# Patient Record
Sex: MALE | Race: WHITE | Employment: OTHER | URBAN - METROPOLITAN AREA
[De-identification: names, ages, dates, MRNs, and addresses within clinical notes are randomized per-mention and may not be internally consistent; named-entity substitution may affect disease eponyms.]

---

## 2021-04-21 ENCOUNTER — OFFICE VISIT (OUTPATIENT)
Dept: ENDOCRINOLOGY | Age: 46
End: 2021-04-21
Payer: COMMERCIAL

## 2021-04-21 VITALS
RESPIRATION RATE: 16 BRPM | TEMPERATURE: 96.6 F | DIASTOLIC BLOOD PRESSURE: 87 MMHG | OXYGEN SATURATION: 97 % | WEIGHT: 275.3 LBS | SYSTOLIC BLOOD PRESSURE: 112 MMHG | BODY MASS INDEX: 34.23 KG/M2 | HEART RATE: 83 BPM | HEIGHT: 75 IN

## 2021-04-21 DIAGNOSIS — R35.1 NOCTURIA: ICD-10-CM

## 2021-04-21 DIAGNOSIS — R63.5 WEIGHT GAIN: ICD-10-CM

## 2021-04-21 DIAGNOSIS — R79.89 LOW TESTOSTERONE: Primary | ICD-10-CM

## 2021-04-21 PROCEDURE — 99204 OFFICE O/P NEW MOD 45 MIN: CPT | Performed by: INTERNAL MEDICINE

## 2021-04-21 RX ORDER — TADALAFIL 10 MG/1
10 TABLET ORAL AS NEEDED
COMMUNITY
End: 2021-08-25

## 2021-04-21 NOTE — LETTER
4/21/2021 Patient: Dorota Wheeler YOB: 1975 Date of Visit: 4/21/2021 Alex Gandhi MD 
[address] [facility] [address] [address] [place] [id number] Via Fax: [phone] Dear Alex Gandhi MD, Thank you for referring Mr. Dorota Wheeler to [address] [place] [address] for evaluation. My notes for this consultation are attached. If you have questions, please do not hesitate to call me. I look forward to following your patient along with you. Sincerely, Yobany Herron MD

## 2021-04-21 NOTE — PROGRESS NOTES
Boby Gray MD          Patient Information Name : Lindie Litten 50 y.o.   YOB: 1975         Referred by: Cristin Johnson MD     Chief Complaint   Patient presents with    New Patient    Other     Testosterone        History of present illness    Lindie Litten is a 50 y.o. male here for evaluation of hypogonadism. [facility]. He presented with decrease in the libido, erectile dysfunction and testosterone was found to be 250 which was drawn at 11 AM.  Complains of snoring, few episodes of choking, no known diagnosis of sleep apnea. He has been gradually gaining weight, physically very active at work, no regular exercise. Not on any diet. No biological children    No trauma,surgery,radiation,chemotherapy  No viral illness (mumps) as a child  No delay in pubertal development  No anosmia  Prior history of alcohol  No new medications  No hair loss  No gynaecomastia  No prostate disorders    History reviewed. No pertinent past medical history. Current Outpatient Medications   Medication Sig    tadalafiL (Cialis) 10 mg tablet Take 10 mg by mouth as needed for Erectile Dysfunction.  OTHER ALEEVE     No current facility-administered medications for this visit. No Known Allergies      Review of Systems:  - Review of all systems negative other than mentioned in HPI    Physical Examination:  Resp. rate 16, height 6' 3\" (1.905 m), weight 275 lb 4.8 oz (124.9 kg).   - General: pleasant, no distress, good eye contact  - HEENT: no pallor, no periorbital edema, EOMI  - Neck: supple, no thyromegaly, no nodules  - Cardiovascular: regular,  normal S1 and S2,   - Respiratory: clear to auscultation bilaterally  - Gastrointestinal: soft, nontender,   - Musculoskeletal: no edema  - Neurological: alert and oriented  - Psychiatric: normal mood and affect    Data Reviewed:     [x] Reviewed labs    Assessment/Plan:       ICD-10-CM ICD-9-CM   1. Low testosterone  R79.89 790.99   2. Nocturia  R35.1 788.43   3. Weight gain  R63.5 783.1     Low testosterone    Patient presents with some features consistent with hypogonadism. 2 morning samples of low testosterone are necessary to confirm the diagnosis as testosterone peaks in the morning. Obesity,alcohol,opiods,obstructive sleep apnea,sellar tumors,medications can cause low testosterone. I discussed the need for additional tests to determine the etiology of hypogonadism. Further evaluation of secondary hypogonadism( if he has) with sellar imaging will be needed depending on the levels of the testosterone and labs. Discussed about the different treatment options . Educated on the risks and benefits of testosterone. The risks include erythrocytosis, worsening of BPH or an existing prostate cancer, worsening of untreated sleep apnea, acne, gynecomastia, decrease in testicular size and the risk of decreased spermatogenesis. There are no Patient Instructions on file for this visit. Copy sent to: Thank you for allowing me to participate in the care of this patient. Yoli Perez MD          Patient Juan M Marr verbalized understanding . Voice-recognition software was used to generate this report, which may result in some phonetic-based errors in the grammar and contents. Even though attempts were made to correct all the mistakes, some may have been missed and remained in the body of the report.

## 2021-04-29 LAB
FERRITIN SERPL-MCNC: 282 NG/ML (ref 26–388)
FSH SERPL-ACNC: 8.1 MIU/ML
LH SERPL-ACNC: 2.8 MIU/ML
PROLACTIN SERPL-MCNC: 7.4 NG/ML
PSA SERPL-MCNC: 0.5 NG/ML (ref 0.01–4)
TSH SERPL DL<=0.05 MIU/L-ACNC: 1.63 UIU/ML (ref 0.36–3.74)

## 2021-05-01 LAB — TESTOST FREE SERPL-MCNC: 4.6 PG/ML (ref 6.8–21.5)

## 2021-05-03 ENCOUNTER — TELEPHONE (OUTPATIENT)
Dept: ENDOCRINOLOGY | Age: 46
End: 2021-05-03

## 2021-05-03 NOTE — TELEPHONE ENCOUNTER
----- Message from Robin Rodriguez MD sent at 5/2/2021 10:27 PM EDT -----  Lab missed doing total testosterone, both total testosterone and free testosterone was ordered. See if we can add to the lab if not patient has to have the blood redrawn for total testosterone in the morning.

## 2021-05-03 NOTE — PROGRESS NOTES
Lab missed doing total testosterone, both total testosterone and free testosterone was ordered. See if we can add to the lab if not patient has to have the blood redrawn for total testosterone in the morning.

## 2021-05-26 ENCOUNTER — OFFICE VISIT (OUTPATIENT)
Dept: ENDOCRINOLOGY | Age: 46
End: 2021-05-26
Payer: COMMERCIAL

## 2021-05-26 VITALS
HEIGHT: 75 IN | SYSTOLIC BLOOD PRESSURE: 118 MMHG | OXYGEN SATURATION: 98 % | DIASTOLIC BLOOD PRESSURE: 85 MMHG | TEMPERATURE: 98.9 F | HEART RATE: 98 BPM | BODY MASS INDEX: 32.58 KG/M2 | WEIGHT: 262 LBS

## 2021-05-26 DIAGNOSIS — R79.89 LOW TESTOSTERONE: Primary | ICD-10-CM

## 2021-05-26 DIAGNOSIS — N52.01 ERECTILE DYSFUNCTION DUE TO ARTERIAL INSUFFICIENCY: ICD-10-CM

## 2021-05-26 DIAGNOSIS — E29.1 HYPOGONADISM IN MALE: ICD-10-CM

## 2021-05-26 PROCEDURE — 99214 OFFICE O/P EST MOD 30 MIN: CPT | Performed by: INTERNAL MEDICINE

## 2021-05-26 RX ORDER — CLOMIPHENE CITRATE 50 MG/1
50 TABLET ORAL DAILY
Qty: 30 TABLET | Refills: 3 | Status: SHIPPED | OUTPATIENT
Start: 2021-05-26 | End: 2021-08-25 | Stop reason: SDUPTHER

## 2021-05-26 NOTE — PROGRESS NOTES
Gabe Garcia MD          Patient Information Name : Paty Astorga 50 y.o.   YOB: 1975         Referred by: Celine Alejandra MD     Chief Complaint   Patient presents with    Other     Low T       History of present illness    Paty Astorga is a 50 y.o. male here for follow-up of hypogonadism. [facility]. He presented with decrease in the libido, erectile dysfunction and testosterone was found to be 250 which was drawn at 11 AM.  Complains of snoring, few episodes of choking  He has been gradually gaining weight, physically very active at work, no regular exercise. Not on any diet. No biological children    No trauma,surgery,radiation,chemotherapy  No viral illness (mumps) as a child  No delay in pubertal development  No anosmia  Prior history of alcohol  No new medications  No hair loss  No gynaecomastia  No prostate disorders    History reviewed. No pertinent past medical history. Current Outpatient Medications   Medication Sig    tadalafiL (Cialis) 10 mg tablet Take 10 mg by mouth as needed for Erectile Dysfunction.  OTHER ALEEVE    clomiPHENE (CLOMID) 50 mg tablet Take 1 Tablet by mouth daily. No current facility-administered medications for this visit. No Known Allergies      Review of Systems:  - Review of all systems negative other than mentioned in HPI    Physical Examination:  Blood pressure 118/85, pulse 98, temperature 98.9 °F (37.2 °C), height 6' 3\" (1.905 m), weight 262 lb (118.8 kg), SpO2 98 %.   - General: pleasant, no distress, good eye contact  - HEENT: no exophthalmos, no periorbital edema, EOMI  - Neck: No visible thyromegaly  - RS: Normal respiratory effort  - Musculoskeletal: no tremors  - Neurological: alert and oriented  - Psychiatric: normal mood and affect  - Skin: Normal color    Data Reviewed:     [x] Reviewed labs    Assessment/Plan: ICD-10-CM ICD-9-CM   1. Low testosterone  R79.89 790.99   2. Erectile dysfunction due to arterial insufficiency  N52.01 607.84   3. 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 The risks include erythrocytosis, worsening of BPH or an existing prostate cancer, worsening of untreated sleep apnea, acne, gynecomastia, decrease in testicular size and the risk of decreased spermatogenesis. There are no Patient Instructions on file for this visit. Follow-up and Dispositions    · Return in about 3 months (around 8/26/2021) for labs before next visit and follow up. Copy sent to: Thank you for allowing me to participate in the care of this patient. Yoli Perez MD          Patient Juan M Marr verbalized understanding . Voice-recognition software was used to generate this report, which may result in some phonetic-based errors in the grammar and contents. Even though attempts were made to correct all the mistakes, some may have been missed and remained in the body of the report.

## 2021-05-26 NOTE — LETTER
5/27/2021 Patient: Dorota Wheeler YOB: 1975 Date of Visit: 5/26/2021 Alex Gandhi MD 
[address] [id number] [place] [id number] Via Fax: [phone] Dear Alex Gandhi MD, Thank you for referring Mr. Dorota Wheeler to [id number] [place] [address] for evaluation. My notes for this consultation are attached. If you have questions, please do not hesitate to call me. I look forward to following your patient along with you. Sincerely, Yobany Herron MD

## 2021-06-04 LAB — TESTOST SERPL-MCNC: 351 NG/DL (ref 264–916)

## 2021-08-18 LAB — TESTOST SERPL-MCNC: 737 NG/DL (ref 264–916)

## 2021-08-25 ENCOUNTER — OFFICE VISIT (OUTPATIENT)
Dept: ENDOCRINOLOGY | Age: 46
End: 2021-08-25
Payer: COMMERCIAL

## 2021-08-25 VITALS
HEIGHT: 75 IN | DIASTOLIC BLOOD PRESSURE: 72 MMHG | WEIGHT: 275 LBS | SYSTOLIC BLOOD PRESSURE: 119 MMHG | TEMPERATURE: 98.8 F | OXYGEN SATURATION: 98 % | RESPIRATION RATE: 18 BRPM | HEART RATE: 72 BPM | BODY MASS INDEX: 34.19 KG/M2

## 2021-08-25 DIAGNOSIS — N52.01 ERECTILE DYSFUNCTION DUE TO ARTERIAL INSUFFICIENCY: ICD-10-CM

## 2021-08-25 DIAGNOSIS — E29.1 HYPOGONADISM IN MALE: Primary | ICD-10-CM

## 2021-08-25 DIAGNOSIS — R79.89 LOW TESTOSTERONE: ICD-10-CM

## 2021-08-25 PROCEDURE — 99214 OFFICE O/P EST MOD 30 MIN: CPT | Performed by: INTERNAL MEDICINE

## 2021-08-25 RX ORDER — CLOMIPHENE CITRATE 50 MG/1
50 TABLET ORAL DAILY
Qty: 30 TABLET | Refills: 3 | Status: SHIPPED | OUTPATIENT
Start: 2021-08-25 | End: 2022-01-28 | Stop reason: SDUPTHER

## 2021-08-25 NOTE — LETTER
8/27/2021    Patient: Analisa Araujo   YOB: 1975   Date of Visit: 8/25/2021     Mikal Adan MD  [address] [facility] [address]  Via Fax: [phone]    Dear Mikal Adan MD,      Thank you for referring Mr. Analisa Araujo to [address] [place] [address] for evaluation. My notes for this consultation are attached. If you have questions, please do not hesitate to call me. I look forward to following your patient along with you.       Sincerely,    Sathish Pereira MD

## 2021-08-25 NOTE — PROGRESS NOTES
Karen Quesada MD          Patient Information Name : Sofiya Beckett 48 y.o.   YOB: 1975         Referred by: Merary Evans MD     Chief Complaint   Patient presents with    Hypogonadism       History of present illness    Sofiya Beckett is a 48 y.o. male here for follow-up of hypogonadism. [facility]. He presented with decrease in the libido, erectile dysfunction and testosterone was found to be 250 which was drawn at 11 AM.  Complains of snoring, few episodes of choking  Reports that he has lost weight, our scale does not show  No biological children    No trauma,surgery,radiation,chemotherapy  No viral illness (mumps) as a child  No delay in pubertal development  No anosmia  Prior history of alcohol  No new medications  No hair loss  No gynaecomastia  No prostate disorders    No past medical history on file. Current Outpatient Medications   Medication Sig    OTHER ALEEVE    clomiPHENE (CLOMID) 50 mg tablet Take 1 Tablet by mouth daily. No current facility-administered medications for this visit. No Known Allergies      Review of Systems:  - Review of all systems negative other than mentioned in HPI    Physical Examination:  Blood pressure 119/72, pulse 72, temperature 98.8 °F (37.1 °C), temperature source Temporal, resp. rate 18, height 6' 3\" (1.905 m), weight 275 lb (124.7 kg), SpO2 98 %. - General: pleasant, no distress, good eye contact  - HEENT: no exophthalmos, no periorbital edema, EOMI  - Neck: No visible thyromegaly  - RS: Normal respiratory effort  - Musculoskeletal: no tremors  - Neurological: alert and oriented  - Psychiatric: normal mood and affect  - Skin: Normal color    Data Reviewed:     [x] Reviewed labs    Assessment/Plan:       ICD-10-CM ICD-9-CM   1. Hypogonadism in male  E29.1 257.2   2.  Erectile dysfunction due to arterial insufficiency N52.01 607.84     Hypogonadism  Erectile dysfunction  Possible sleep apnea    Hypogonadotropic hypogonadism  Initial testosterone 250    Prolactin, ferritin normal  Discussed about getting tested for sleep apnea, follow-up with PCP  Doing well on off label use of clomiphene citrate    Educated on the risks and benefits of testosterone. The risks include erythrocytosis, worsening of BPH or an existing prostate cancer, worsening of untreated sleep apnea, acne, gynecomastia, decrease in testicular size and the risk of decreased spermatogenesis. ED: Cialis 20 mg did not help  He is on Viagra    Nonmorbid obesity: Goal is to lose 30 pounds so he can come off  testosterone, which also will help sleep apnea if he has. Aware of symptoms of sleep apnea  There are no Patient Instructions on file for this visit. Follow-up and Dispositions    · Return in about 5 months (around 1/25/2022) for labs before next visit and follow up. Copy sent to: Thank you for allowing me to participate in the care of this patient. Drake Marquez MD          Patient Georgie Cooney verbalized understanding . Voice-recognition software was used to generate this report, which may result in some phonetic-based errors in the grammar and contents. Even though attempts were made to correct all the mistakes, some may have been missed and remained in the body of the report.

## 2021-08-25 NOTE — PROGRESS NOTES
Garfield Ayala is a 48 y.o. male here for   Chief Complaint   Patient presents with    Hypogonadism       1. Have you been to the ER, urgent care clinic since your last visit? Hospitalized since your last visit? -no    2. Have you seen or consulted any other health care providers outside of the [address] [place] [address] since your last visit?   Include any pap smears or colon screening.-no

## 2022-01-25 LAB
HCT VFR BLD AUTO: 45.5 % (ref 37.5–51)
HGB BLD-MCNC: 15.6 G/DL (ref 13–17.7)
PSA SERPL-MCNC: 0.4 NG/ML (ref 0–4)
TESTOST SERPL-MCNC: 459 NG/DL (ref 264–916)

## 2022-01-28 ENCOUNTER — OFFICE VISIT (OUTPATIENT)
Dept: ENDOCRINOLOGY | Age: 47
End: 2022-01-28
Payer: COMMERCIAL

## 2022-01-28 VITALS
TEMPERATURE: 98.3 F | SYSTOLIC BLOOD PRESSURE: 128 MMHG | OXYGEN SATURATION: 96 % | DIASTOLIC BLOOD PRESSURE: 82 MMHG | HEART RATE: 69 BPM | BODY MASS INDEX: 34.69 KG/M2 | RESPIRATION RATE: 18 BRPM | HEIGHT: 75 IN | WEIGHT: 279 LBS

## 2022-01-28 DIAGNOSIS — N52.01 ERECTILE DYSFUNCTION DUE TO ARTERIAL INSUFFICIENCY: Primary | ICD-10-CM

## 2022-01-28 DIAGNOSIS — R79.89 LOW TESTOSTERONE: ICD-10-CM

## 2022-01-28 DIAGNOSIS — E66.9 NON MORBID OBESITY: ICD-10-CM

## 2022-01-28 PROCEDURE — 99214 OFFICE O/P EST MOD 30 MIN: CPT | Performed by: INTERNAL MEDICINE

## 2022-01-28 RX ORDER — CLOMIPHENE CITRATE 50 MG/1
50 TABLET ORAL DAILY
Qty: 30 TABLET | Refills: 1 | Status: SHIPPED | OUTPATIENT
Start: 2022-01-28

## 2022-01-28 NOTE — PROGRESS NOTES
Kassidy Soares is a 48 y.o. male here for   Chief Complaint   Patient presents with    Hypogonadism       1. Have you been to the ER, urgent care clinic since your last visit? Hospitalized since your last visit? -no    2. Have you seen or consulted any other health care providers outside of the [address] [place] [address] since your last visit?   Include any pap smears or colon screening.-[facility]

## 2022-01-28 NOTE — PROGRESS NOTES
Barrett Le MD          Patient Information Name : Robinson Franz 48 y.o.   YOB: 1975         Referred by: Elvia Puente MD     Chief Complaint   Patient presents with    Hypogonadism       History of present illness    Robinson Franz is a 48 y.o. male here for follow-up of hypogonadism. [facility]. He presented with decrease in the libido, erectile dysfunction and testosterone was found to be 250 which was drawn at 11 AM.  Complains of snoring, few episodes of choking, did not have sleep study yet  No weight loss  Off Clomid for 1-1/2-month    No biological children    No trauma,surgery,radiation,chemotherapy  No viral illness (mumps) as a child  No delay in pubertal development  No anosmia  Prior history of alcohol  No new medications  No hair loss  No gynaecomastia  No prostate disorders    History reviewed. No pertinent past medical history. Current Outpatient Medications   Medication Sig    clomiPHENE (CLOMID) 50 mg tablet Take 1 Tablet by mouth daily.  OTHER ALEEVE     No current facility-administered medications for this visit. No Known Allergies      Review of Systems:  - Review of all systems negative other than mentioned in HPI    Physical Examination:  Blood pressure 128/82, pulse 69, temperature 98.3 °F (36.8 °C), temperature source Temporal, resp. rate 18, height 6' 3\" (1.905 m), weight 279 lb (126.6 kg), SpO2 96 %. - General: pleasant, no distress, good eye contact  - HEENT: no exophthalmos, no periorbital edema, EOMI  - Neck: No visible thyromegaly  - RS: Normal respiratory effort  - Musculoskeletal: no tremors  - Neurological: alert and oriented  - Psychiatric: normal mood and affect  - Skin: Normal color    Data Reviewed:     [x] Reviewed labs    Assessment/Plan:       ICD-10-CM ICD-9-CM   1.  Erectile dysfunction due to arterial insufficiency N52.01 607.84   2. Low testosterone  R79.89 790.99     Hypogonadism  Erectile dysfunction  Possible sleep apnea    Hypogonadotropic hypogonadism  Initial testosterone level was 250, after Clomid 1-1/2-month, testosterone level more than 400  Continue staying off  Weight loss encouraged    Prolactin, ferritin normal  Discussed about getting tested for sleep apnea, follow-up with PCP  Doing well on off label use of clomiphene citrate    Educated on the risks and benefits of testosterone. The risks include erythrocytosis, worsening of BPH or an existing prostate cancer, worsening of untreated sleep apnea, acne, gynecomastia, decrease in testicular size and the risk of decreased spermatogenesis. ED: Cialis 20 mg did not help  He is on Viagra    Nonmorbid obesity: Goal is to lose 30 pounds so he can come off  testosterone, which also will help sleep apnea if he has. Aware of symptoms of sleep apnea  There are no Patient Instructions on file for this visit. Follow-up and Dispositions    · Return in about 6 months (around 7/28/2022) for labs before next visit and follow up. Copy sent to: Thank you for allowing me to participate in the care of this patient. Olen Scheuermann, MD          Patient Jovan Found verbalized understanding . Voice-recognition software was used to generate this report, which may result in some phonetic-based errors in the grammar and contents. Even though attempts were made to correct all the mistakes, some may have been missed and remained in the body of the report.

## 2022-01-28 NOTE — LETTER
1/28/2022    Patient: Ruth Garcia   YOB: 1975   Date of Visit: 1/28/2022     Penelope Dowd MD  [address] [facility] [address]  Via Fax: [phone]    Dear Penelope Dowd MD,      Thank you for referring Mr. Ruth Garcia to [address] [place] [address] for evaluation. My notes for this consultation are attached. If you have questions, please do not hesitate to call me. I look forward to following your patient along with you.       Sincerely,    Doris Castillo MD

## 2022-07-25 LAB
ALBUMIN SERPL-MCNC: 4.8 G/DL (ref 4–5)
ALP SERPL-CCNC: 81 IU/L (ref 44–121)
ALT SERPL-CCNC: 92 IU/L (ref 0–44)
AST SERPL-CCNC: 50 IU/L (ref 0–40)
BILIRUB DIRECT SERPL-MCNC: <0.1 MG/DL (ref 0–0.4)
BILIRUB SERPL-MCNC: 0.4 MG/DL (ref 0–1.2)
HCT VFR BLD AUTO: 42.7 % (ref 37.5–51)
HGB BLD-MCNC: 14.5 G/DL (ref 13–17.7)
PROT SERPL-MCNC: 7.6 G/DL (ref 6–8.5)
TESTOST SERPL-MCNC: 328 NG/DL (ref 264–916)

## 2022-07-29 ENCOUNTER — OFFICE VISIT (OUTPATIENT)
Dept: ENDOCRINOLOGY | Age: 47
End: 2022-07-29
Payer: COMMERCIAL

## 2022-07-29 VITALS
DIASTOLIC BLOOD PRESSURE: 77 MMHG | OXYGEN SATURATION: 97 % | HEART RATE: 73 BPM | BODY MASS INDEX: 34.07 KG/M2 | HEIGHT: 75 IN | WEIGHT: 274 LBS | SYSTOLIC BLOOD PRESSURE: 122 MMHG | TEMPERATURE: 98 F

## 2022-07-29 DIAGNOSIS — R35.1 NOCTURIA: ICD-10-CM

## 2022-07-29 DIAGNOSIS — N52.01 ERECTILE DYSFUNCTION DUE TO ARTERIAL INSUFFICIENCY: ICD-10-CM

## 2022-07-29 DIAGNOSIS — R74.01 TRANSAMINITIS: ICD-10-CM

## 2022-07-29 DIAGNOSIS — E29.1 HYPOGONADISM IN MALE: Primary | ICD-10-CM

## 2022-07-29 PROCEDURE — 99214 OFFICE O/P EST MOD 30 MIN: CPT | Performed by: INTERNAL MEDICINE

## 2022-07-29 NOTE — PROGRESS NOTES
Jorge Alberto Villalta MD          Patient Information Name : Patito Mello 50 y.o.   YOB: 1975         Referred by: Yonas Booth MD     Chief Complaint   Patient presents with    Other     Low T         History of present illness    Patito Mello is a 50 y.o. male here for follow-up of hypogonadism. [facility]. He presented with decrease in the libido, erectile dysfunction and testosterone was found to be 250 which was drawn at 11 AM.  Complains of snoring, few episodes of choking, did not have sleep study yet    Off Clomid: Testosterone 450, then 350  Continue to work on lifestyle  No chest pain  Liver enzymes elevated, no alcohol, asymptomatic    No biological children      History reviewed. No pertinent past medical history. Current Outpatient Medications   Medication Sig    clomiPHENE (CLOMID) 50 mg tablet Take 1 Tablet by mouth daily. OTHER ALEEVE     No current facility-administered medications for this visit. No Known Allergies      Review of Systems:  Per HPI    Physical Examination:  Blood pressure 122/77, pulse 73, temperature 98 °F (36.7 °C), temperature source Temporal, height 6' 3\" (1.905 m), weight 274 lb (124.3 kg), SpO2 97 %. General: pleasant, no distress, good eye contact  HEENT: no exophthalmos, no periorbital edema, EOMI  Neck: No visible thyromegaly  RS: Normal respiratory effort  Abdomen: No hepatomegaly, nontender  Musculoskeletal: no tremors  Neurological: alert and oriented  Psychiatric: normal mood and affect  Skin: Normal color    Data Reviewed:     [x] Reviewed labs    Assessment/Plan:       ICD-10-CM ICD-9-CM   1. Hypogonadism in male  E29.1 257.2   2. Nocturia  R35.1 788.43   3. Non morbid obesity  E66.9 278.00   4. Erectile dysfunction due to arterial insufficiency  N52.01 607.84   5.  Transaminitis  R74.01 790.4       Hypogonadism  Erectile dysfunction  Possible sleep apnea: We will get sleep study    Hypogonadotropic hypogonadism  Initial testosterone level was 250, after Clomid 1-1/2-month, testosterone level more than 400  Testosterone 320 July 2022  Off Clomid  Weight loss encouraged    Prolactin, ferritin normal    Was Doing well on off label use of clomiphene citrate    Transaminitis: Asymptomatic, no alcohol, no change in the medications  Right upper quadrant ultrasound  Repeat LFTs  Follow-up with PCP      ED: Cialis 20 mg did not help  He is on Viagra    Nonmorbid obesity: Goal is to lose 30 pounds so he can come off  testosterone, which also will help sleep apnea if he has. Aware of symptoms of sleep apnea  There are no Patient Instructions on file for this visit. Copy sent to: Thank you for allowing me to participate in the care of this patient. Donna Pressley MD          Patient Guerita Huffman verbalized understanding . Voice-recognition software was used to generate this report, which may result in some phonetic-based errors in the grammar and contents. Even though attempts were made to correct all the mistakes, some may have been missed and remained in the body of the report.

## 2022-07-29 NOTE — LETTER
7/29/2022    Patient: Tim Lassiter   YOB: 1975   Date of Visit: 7/29/2022     James Albrecht MD  [address] [facility] [address]  Via Fax: [phone]    Dear James Albrecht MD,      Thank you for referring Mr. Tim Lassiter to [address] [place] [address] for evaluation. My notes for this consultation are attached. If you have questions, please do not hesitate to call me. I look forward to following your patient along with you.       Sincerely,    Scar Houser MD

## 2022-08-08 ENCOUNTER — HOSPITAL ENCOUNTER (OUTPATIENT)
Dept: ULTRASOUND IMAGING | Age: 47
Discharge: HOME | End: 2022-08-08
Attending: INTERNAL MEDICINE
Payer: COMMERCIAL

## 2022-08-08 DIAGNOSIS — R74.01 TRANSAMINITIS: ICD-10-CM

## 2022-08-08 DIAGNOSIS — N52.01 ERECTILE DYSFUNCTION DUE TO ARTERIAL INSUFFICIENCY: ICD-10-CM

## 2022-08-08 DIAGNOSIS — R35.1 NOCTURIA: ICD-10-CM

## 2022-08-08 DIAGNOSIS — E29.1 HYPOGONADISM IN MALE: ICD-10-CM

## 2022-08-08 PROCEDURE — 76705 ECHO EXAM OF ABDOMEN: CPT

## 2022-11-17 ENCOUNTER — HOSPITAL ENCOUNTER (OUTPATIENT)
Dept: PREADMISSION TESTING | Age: 47
Discharge: HOME | End: 2022-11-17
Payer: COMMERCIAL

## 2022-11-17 VITALS
HEART RATE: 72 BPM | SYSTOLIC BLOOD PRESSURE: 137 MMHG | RESPIRATION RATE: 16 BRPM | TEMPERATURE: 46 F | OXYGEN SATURATION: 98 % | WEIGHT: 286.6 LBS | BODY MASS INDEX: 34.9 KG/M2 | DIASTOLIC BLOOD PRESSURE: 98 MMHG | HEIGHT: 76 IN

## 2022-11-17 LAB
ANION GAP SERPL CALC-SCNC: 4 MMOL/L (ref 5–15)
BUN SERPL-MCNC: 14 MG/DL (ref 6–20)
BUN/CREAT SERPL: 14 (ref 12–20)
CA-I BLD-MCNC: 9.4 MG/DL (ref 8.5–10.1)
CHLORIDE SERPL-SCNC: 110 MMOL/L (ref 97–108)
CO2 SERPL-SCNC: 26 MMOL/L (ref 21–32)
CREAT SERPL-MCNC: 0.99 MG/DL (ref 0.7–1.3)
ERYTHROCYTE [DISTWIDTH] IN BLOOD BY AUTOMATED COUNT: 11.9 % (ref 11.5–14.5)
GLUCOSE SERPL-MCNC: 134 MG/DL (ref 65–100)
HCT VFR BLD AUTO: 44.6 % (ref 36.6–50.3)
HGB BLD-MCNC: 15.2 G/DL (ref 12.1–17)
MCH RBC QN AUTO: 31 PG (ref 26–34)
MCHC RBC AUTO-ENTMCNC: 34.1 G/DL (ref 30–36.5)
MCV RBC AUTO: 91 FL (ref 80–99)
NRBC # BLD: 0 K/UL (ref 0–0.01)
NRBC BLD-RTO: 0 PER 100 WBC
PLATELET # BLD AUTO: 266 K/UL (ref 150–400)
PMV BLD AUTO: 9.5 FL (ref 8.9–12.9)
POTASSIUM SERPL-SCNC: 4.2 MMOL/L (ref 3.5–5.1)
RBC # BLD AUTO: 4.9 M/UL (ref 4.1–5.7)
SODIUM SERPL-SCNC: 140 MMOL/L (ref 136–145)
WBC # BLD AUTO: 7.8 K/UL (ref 4.1–11.1)

## 2022-11-17 PROCEDURE — 85027 COMPLETE CBC AUTOMATED: CPT

## 2022-11-17 PROCEDURE — 93005 ELECTROCARDIOGRAM TRACING: CPT

## 2022-11-17 PROCEDURE — 80048 BASIC METABOLIC PNL TOTAL CA: CPT

## 2022-11-17 PROCEDURE — 36415 COLL VENOUS BLD VENIPUNCTURE: CPT

## 2022-11-18 LAB
ATRIAL RATE: 70 BPM
CALCULATED P AXIS, ECG09: 32 DEGREES
CALCULATED R AXIS, ECG10: -34 DEGREES
CALCULATED T AXIS, ECG11: 30 DEGREES
DIAGNOSIS, 93000: NORMAL
P-R INTERVAL, ECG05: 210 MS
Q-T INTERVAL, ECG07: 404 MS
QRS DURATION, ECG06: 94 MS
QTC CALCULATION (BEZET), ECG08: 436 MS
VENTRICULAR RATE, ECG03: 70 BPM

## 2022-11-21 ENCOUNTER — ANESTHESIA (OUTPATIENT)
Dept: SURGERY | Age: 47
End: 2022-11-21
Payer: COMMERCIAL

## 2022-11-21 ENCOUNTER — ANESTHESIA EVENT (OUTPATIENT)
Dept: SURGERY | Age: 47
End: 2022-11-21
Payer: COMMERCIAL

## 2022-11-21 ENCOUNTER — HOSPITAL ENCOUNTER (OUTPATIENT)
Age: 47
Discharge: HOME | End: 2022-11-21
Attending: ORTHOPAEDIC SURGERY | Admitting: ORTHOPAEDIC SURGERY
Payer: COMMERCIAL

## 2022-11-21 VITALS
TEMPERATURE: 97.9 F | OXYGEN SATURATION: 97 % | RESPIRATION RATE: 20 BRPM | SYSTOLIC BLOOD PRESSURE: 131 MMHG | DIASTOLIC BLOOD PRESSURE: 81 MMHG | HEART RATE: 72 BPM

## 2022-11-21 DIAGNOSIS — S43.432A LABRAL TEAR OF SHOULDER, LEFT, INITIAL ENCOUNTER: Primary | ICD-10-CM

## 2022-11-21 PROCEDURE — 76210000021 HC REC RM PH II 0.5 TO 1 HR: Performed by: ORTHOPAEDIC SURGERY

## 2022-11-21 PROCEDURE — A4565 SLINGS: HCPCS | Performed by: ORTHOPAEDIC SURGERY

## 2022-11-21 PROCEDURE — 76060000035 HC ANESTHESIA 2 TO 2.5 HR: Performed by: ORTHOPAEDIC SURGERY

## 2022-11-21 PROCEDURE — 77030034478 HC TU IRR ARTHRO PT ARTH -B: Performed by: ORTHOPAEDIC SURGERY

## 2022-11-21 PROCEDURE — 74011250636 HC RX REV CODE- 250/636: Performed by: REGISTERED NURSE

## 2022-11-21 PROCEDURE — 77030018673: Performed by: ORTHOPAEDIC SURGERY

## 2022-11-21 PROCEDURE — 77030002916 HC SUT ETHLN J&J -A: Performed by: ORTHOPAEDIC SURGERY

## 2022-11-21 PROCEDURE — 2709999900 HC NON-CHARGEABLE SUPPLY: Performed by: ORTHOPAEDIC SURGERY

## 2022-11-21 PROCEDURE — 77030010816: Performed by: ORTHOPAEDIC SURGERY

## 2022-11-21 PROCEDURE — 77030040361 HC SLV COMPR DVT MDII -B: Performed by: ORTHOPAEDIC SURGERY

## 2022-11-21 PROCEDURE — 74011250636 HC RX REV CODE- 250/636: Performed by: ANESTHESIOLOGY

## 2022-11-21 PROCEDURE — 76010000131 HC OR TIME 2 TO 2.5 HR: Performed by: ORTHOPAEDIC SURGERY

## 2022-11-21 PROCEDURE — 77030002966 HC SUT PDS J&J -A: Performed by: ORTHOPAEDIC SURGERY

## 2022-11-21 PROCEDURE — 77030008009 HC PRB ARTHO ABLT ARTH -C: Performed by: ORTHOPAEDIC SURGERY

## 2022-11-21 PROCEDURE — 77030022877 HC TU IRR ARTHRO PMP ARTH -B: Performed by: ORTHOPAEDIC SURGERY

## 2022-11-21 PROCEDURE — 77030022878 HC BUR ARTH SHVR ARTH -B: Performed by: ORTHOPAEDIC SURGERY

## 2022-11-21 PROCEDURE — 77030002922 HC SUT FBRWRE ARTH -B: Performed by: ORTHOPAEDIC SURGERY

## 2022-11-21 PROCEDURE — 76210000006 HC OR PH I REC 0.5 TO 1 HR: Performed by: ORTHOPAEDIC SURGERY

## 2022-11-21 PROCEDURE — 77030008493 HC TBNG ARTHOSC EXT ARTH -B: Performed by: ORTHOPAEDIC SURGERY

## 2022-11-21 PROCEDURE — 77030031140 HC SUT VCRL3 J&J -A: Performed by: ORTHOPAEDIC SURGERY

## 2022-11-21 PROCEDURE — 77030003665 HC NDL SPN BBMI -A: Performed by: ORTHOPAEDIC SURGERY

## 2022-11-21 PROCEDURE — 74011250636 HC RX REV CODE- 250/636: Performed by: ORTHOPAEDIC SURGERY

## 2022-11-21 PROCEDURE — 77030002933 HC SUT MCRYL J&J -A: Performed by: ORTHOPAEDIC SURGERY

## 2022-11-21 PROCEDURE — 77030003598 HC NDL MULT/FIRE ARTH -C: Performed by: ORTHOPAEDIC SURGERY

## 2022-11-21 PROCEDURE — 77030038066 HC BLD SHVR ARTH -B: Performed by: ORTHOPAEDIC SURGERY

## 2022-11-21 PROCEDURE — 74011000250 HC RX REV CODE- 250: Performed by: ANESTHESIOLOGY

## 2022-11-21 PROCEDURE — 74011000250 HC RX REV CODE- 250: Performed by: REGISTERED NURSE

## 2022-11-21 PROCEDURE — 77030042022 HC SYST COLD THRPY BREG -B: Performed by: ORTHOPAEDIC SURGERY

## 2022-11-21 RX ORDER — ROCURONIUM BROMIDE 10 MG/ML
INJECTION, SOLUTION INTRAVENOUS AS NEEDED
Status: DISCONTINUED | OUTPATIENT
Start: 2022-11-21 | End: 2022-11-21 | Stop reason: HOSPADM

## 2022-11-21 RX ORDER — BUPIVACAINE HYDROCHLORIDE 5 MG/ML
INJECTION, SOLUTION EPIDURAL; INTRACAUDAL
Status: COMPLETED | OUTPATIENT
Start: 2022-11-21 | End: 2022-11-21

## 2022-11-21 RX ORDER — DIPHENHYDRAMINE HYDROCHLORIDE 50 MG/ML
12.5 INJECTION, SOLUTION INTRAMUSCULAR; INTRAVENOUS AS NEEDED
Status: DISCONTINUED | OUTPATIENT
Start: 2022-11-21 | End: 2022-11-21 | Stop reason: HOSPADM

## 2022-11-21 RX ORDER — HYDROMORPHONE HYDROCHLORIDE 1 MG/ML
0.5 INJECTION, SOLUTION INTRAMUSCULAR; INTRAVENOUS; SUBCUTANEOUS
Status: DISCONTINUED | OUTPATIENT
Start: 2022-11-21 | End: 2022-11-21 | Stop reason: HOSPADM

## 2022-11-21 RX ORDER — CHOLECALCIFEROL (VITAMIN D3) 125 MCG
CAPSULE ORAL
COMMUNITY

## 2022-11-21 RX ORDER — SODIUM CHLORIDE 0.9 % (FLUSH) 0.9 %
5-40 SYRINGE (ML) INJECTION EVERY 8 HOURS
Status: DISCONTINUED | OUTPATIENT
Start: 2022-11-21 | End: 2022-11-21 | Stop reason: HOSPADM

## 2022-11-21 RX ORDER — ONDANSETRON 2 MG/ML
INJECTION INTRAMUSCULAR; INTRAVENOUS AS NEEDED
Status: DISCONTINUED | OUTPATIENT
Start: 2022-11-21 | End: 2022-11-21 | Stop reason: HOSPADM

## 2022-11-21 RX ORDER — OXYCODONE AND ACETAMINOPHEN 5; 325 MG/1; MG/1
1 TABLET ORAL AS NEEDED
Status: DISCONTINUED | OUTPATIENT
Start: 2022-11-21 | End: 2022-11-21 | Stop reason: HOSPADM

## 2022-11-21 RX ORDER — PROPOFOL 10 MG/ML
INJECTION, EMULSION INTRAVENOUS AS NEEDED
Status: DISCONTINUED | OUTPATIENT
Start: 2022-11-21 | End: 2022-11-21 | Stop reason: HOSPADM

## 2022-11-21 RX ORDER — MIDAZOLAM HYDROCHLORIDE 1 MG/ML
1 INJECTION, SOLUTION INTRAMUSCULAR; INTRAVENOUS AS NEEDED
Status: DISCONTINUED | OUTPATIENT
Start: 2022-11-21 | End: 2022-11-21 | Stop reason: HOSPADM

## 2022-11-21 RX ORDER — SODIUM CHLORIDE, SODIUM LACTATE, POTASSIUM CHLORIDE, CALCIUM CHLORIDE 600; 310; 30; 20 MG/100ML; MG/100ML; MG/100ML; MG/100ML
INJECTION, SOLUTION INTRAVENOUS
Status: DISCONTINUED | OUTPATIENT
Start: 2022-11-21 | End: 2022-11-21 | Stop reason: HOSPADM

## 2022-11-21 RX ORDER — CYCLOBENZAPRINE HCL 10 MG
5 TABLET ORAL
Qty: 21 TABLET | Refills: 0 | Status: SHIPPED | OUTPATIENT
Start: 2022-11-21

## 2022-11-21 RX ORDER — LIDOCAINE HYDROCHLORIDE 20 MG/ML
INJECTION, SOLUTION EPIDURAL; INFILTRATION; INTRACAUDAL; PERINEURAL AS NEEDED
Status: DISCONTINUED | OUTPATIENT
Start: 2022-11-21 | End: 2022-11-21 | Stop reason: HOSPADM

## 2022-11-21 RX ORDER — DEXAMETHASONE SODIUM PHOSPHATE 4 MG/ML
INJECTION, SOLUTION INTRA-ARTICULAR; INTRALESIONAL; INTRAMUSCULAR; INTRAVENOUS; SOFT TISSUE AS NEEDED
Status: DISCONTINUED | OUTPATIENT
Start: 2022-11-21 | End: 2022-11-21 | Stop reason: HOSPADM

## 2022-11-21 RX ORDER — MORPHINE SULFATE 2 MG/ML
2 INJECTION, SOLUTION INTRAMUSCULAR; INTRAVENOUS
Status: DISCONTINUED | OUTPATIENT
Start: 2022-11-21 | End: 2022-11-21 | Stop reason: HOSPADM

## 2022-11-21 RX ORDER — SODIUM CHLORIDE 0.9 % (FLUSH) 0.9 %
5-40 SYRINGE (ML) INJECTION AS NEEDED
Status: DISCONTINUED | OUTPATIENT
Start: 2022-11-21 | End: 2022-11-21 | Stop reason: HOSPADM

## 2022-11-21 RX ORDER — TRAMADOL HYDROCHLORIDE 50 MG/1
50 TABLET ORAL
Qty: 30 TABLET | Refills: 0 | Status: SHIPPED | OUTPATIENT
Start: 2022-11-21 | End: 2022-12-05

## 2022-11-21 RX ORDER — OXYCODONE HYDROCHLORIDE 5 MG/1
5 TABLET ORAL
Qty: 30 TABLET | Refills: 0 | Status: SHIPPED | OUTPATIENT
Start: 2022-11-21 | End: 2022-12-05

## 2022-11-21 RX ORDER — SODIUM CHLORIDE, SODIUM LACTATE, POTASSIUM CHLORIDE, CALCIUM CHLORIDE 600; 310; 30; 20 MG/100ML; MG/100ML; MG/100ML; MG/100ML
20 INJECTION, SOLUTION INTRAVENOUS CONTINUOUS
Status: DISCONTINUED | OUTPATIENT
Start: 2022-11-21 | End: 2022-11-21 | Stop reason: HOSPADM

## 2022-11-21 RX ORDER — DEXAMETHASONE SODIUM PHOSPHATE 4 MG/ML
INJECTION, SOLUTION INTRA-ARTICULAR; INTRALESIONAL; INTRAMUSCULAR; INTRAVENOUS; SOFT TISSUE
Status: COMPLETED | OUTPATIENT
Start: 2022-11-21 | End: 2022-11-21

## 2022-11-21 RX ORDER — NORETHINDRONE AND ETHINYL ESTRADIOL 0.5-0.035
5 KIT ORAL AS NEEDED
Status: DISCONTINUED | OUTPATIENT
Start: 2022-11-21 | End: 2022-11-21 | Stop reason: HOSPADM

## 2022-11-21 RX ORDER — LIDOCAINE HYDROCHLORIDE 10 MG/ML
0.1 INJECTION, SOLUTION EPIDURAL; INFILTRATION; INTRACAUDAL; PERINEURAL AS NEEDED
Status: DISCONTINUED | OUTPATIENT
Start: 2022-11-21 | End: 2022-11-21 | Stop reason: HOSPADM

## 2022-11-21 RX ORDER — EPINEPHRINE 1 MG/ML
INJECTION, SOLUTION, CONCENTRATE INTRAVENOUS
Status: COMPLETED | OUTPATIENT
Start: 2022-11-21 | End: 2022-11-21

## 2022-11-21 RX ORDER — VECURONIUM BROMIDE FOR INJECTION 1 MG/ML
INJECTION, POWDER, LYOPHILIZED, FOR SOLUTION INTRAVENOUS AS NEEDED
Status: DISCONTINUED | OUTPATIENT
Start: 2022-11-21 | End: 2022-11-21 | Stop reason: HOSPADM

## 2022-11-21 RX ORDER — ACETAMINOPHEN 500 MG
500 TABLET ORAL
COMMUNITY

## 2022-11-21 RX ORDER — ONDANSETRON 2 MG/ML
4 INJECTION INTRAMUSCULAR; INTRAVENOUS AS NEEDED
Status: DISCONTINUED | OUTPATIENT
Start: 2022-11-21 | End: 2022-11-21 | Stop reason: HOSPADM

## 2022-11-21 RX ORDER — TRANEXAMIC ACID 100 MG/ML
INJECTION, SOLUTION INTRAVENOUS AS NEEDED
Status: DISCONTINUED | OUTPATIENT
Start: 2022-11-21 | End: 2022-11-21 | Stop reason: HOSPADM

## 2022-11-21 RX ORDER — MIDAZOLAM HYDROCHLORIDE 1 MG/ML
0.5 INJECTION, SOLUTION INTRAMUSCULAR; INTRAVENOUS
Status: DISCONTINUED | OUTPATIENT
Start: 2022-11-21 | End: 2022-11-21 | Stop reason: HOSPADM

## 2022-11-21 RX ORDER — ACETAMINOPHEN 325 MG/1
TABLET ORAL
Status: ON HOLD | COMMUNITY
End: 2022-11-21

## 2022-11-21 RX ORDER — MIDAZOLAM HYDROCHLORIDE 1 MG/ML
INJECTION, SOLUTION INTRAMUSCULAR; INTRAVENOUS AS NEEDED
Status: DISCONTINUED | OUTPATIENT
Start: 2022-11-21 | End: 2022-11-21 | Stop reason: HOSPADM

## 2022-11-21 RX ORDER — METOPROLOL TARTRATE 5 MG/5ML
INJECTION INTRAVENOUS AS NEEDED
Status: DISCONTINUED | OUTPATIENT
Start: 2022-11-21 | End: 2022-11-21 | Stop reason: HOSPADM

## 2022-11-21 RX ORDER — FENTANYL CITRATE 50 UG/ML
50 INJECTION, SOLUTION INTRAMUSCULAR; INTRAVENOUS AS NEEDED
Status: DISCONTINUED | OUTPATIENT
Start: 2022-11-21 | End: 2022-11-21 | Stop reason: HOSPADM

## 2022-11-21 RX ORDER — EPINEPHRINE 1 MG/ML
INJECTION INTRAMUSCULAR; INTRAVENOUS; SUBCUTANEOUS AS NEEDED
Status: DISCONTINUED | OUTPATIENT
Start: 2022-11-21 | End: 2022-11-21 | Stop reason: HOSPADM

## 2022-11-21 RX ORDER — CEFAZOLIN SODIUM 1 G/3ML
INJECTION, POWDER, FOR SOLUTION INTRAMUSCULAR; INTRAVENOUS AS NEEDED
Status: DISCONTINUED | OUTPATIENT
Start: 2022-11-21 | End: 2022-11-21 | Stop reason: HOSPADM

## 2022-11-21 RX ORDER — FENTANYL CITRATE 50 UG/ML
50 INJECTION, SOLUTION INTRAMUSCULAR; INTRAVENOUS
Status: DISCONTINUED | OUTPATIENT
Start: 2022-11-21 | End: 2022-11-21 | Stop reason: HOSPADM

## 2022-11-21 RX ADMIN — SUGAMMADEX 200 MG: 100 INJECTION, SOLUTION INTRAVENOUS at 14:01

## 2022-11-21 RX ADMIN — ONDANSETRON 4 MG: 2 INJECTION INTRAMUSCULAR; INTRAVENOUS at 12:47

## 2022-11-21 RX ADMIN — MIDAZOLAM HYDROCHLORIDE 2 MG: 2 INJECTION, SOLUTION INTRAMUSCULAR; INTRAVENOUS at 10:27

## 2022-11-21 RX ADMIN — BUPIVACAINE HYDROCHLORIDE 25 ML: 5 INJECTION, SOLUTION EPIDURAL; INTRACAUDAL; PERINEURAL at 10:31

## 2022-11-21 RX ADMIN — SODIUM CHLORIDE, POTASSIUM CHLORIDE, SODIUM LACTATE AND CALCIUM CHLORIDE 20 ML/HR: 600; 310; 30; 20 INJECTION, SOLUTION INTRAVENOUS at 09:25

## 2022-11-21 RX ADMIN — CEFAZOLIN SODIUM 3 G: 1 INJECTION, POWDER, FOR SOLUTION INTRAMUSCULAR; INTRAVENOUS at 12:44

## 2022-11-21 RX ADMIN — ROCURONIUM BROMIDE 50 MG: 10 INJECTION, SOLUTION INTRAVENOUS at 12:18

## 2022-11-21 RX ADMIN — LIDOCAINE HYDROCHLORIDE 100 MG: 20 INJECTION, SOLUTION EPIDURAL; INFILTRATION; INTRACAUDAL; PERINEURAL at 12:17

## 2022-11-21 RX ADMIN — DEXAMETHASONE SODIUM PHOSPHATE 4 MG: 4 INJECTION, SOLUTION INTRA-ARTICULAR; INTRALESIONAL; INTRAMUSCULAR; INTRAVENOUS; SOFT TISSUE at 10:31

## 2022-11-21 RX ADMIN — SUGAMMADEX 100 MG: 100 INJECTION, SOLUTION INTRAVENOUS at 14:11

## 2022-11-21 RX ADMIN — METOPROLOL TARTRATE 3 MG: 5 INJECTION, SOLUTION INTRAVENOUS at 14:18

## 2022-11-21 RX ADMIN — EPINEPHRINE 0.1 MG: 1 INJECTION INTRAMUSCULAR; INTRAVENOUS; SUBCUTANEOUS at 10:31

## 2022-11-21 RX ADMIN — FENTANYL CITRATE 100 MCG: 50 INJECTION, SOLUTION INTRAMUSCULAR; INTRAVENOUS at 10:27

## 2022-11-21 RX ADMIN — PROPOFOL 200 MG: 10 INJECTION, EMULSION INTRAVENOUS at 12:17

## 2022-11-21 RX ADMIN — VECURONIUM BROMIDE 5 MG: 1 INJECTION, POWDER, LYOPHILIZED, FOR SOLUTION INTRAVENOUS at 12:49

## 2022-11-21 RX ADMIN — DEXAMETHASONE SODIUM PHOSPHATE 8 MG: 4 INJECTION, SOLUTION INTRA-ARTICULAR; INTRALESIONAL; INTRAMUSCULAR; INTRAVENOUS; SOFT TISSUE at 12:47

## 2022-11-21 RX ADMIN — TRANEXAMIC ACID 1 G: 1 INJECTION, SOLUTION INTRAVENOUS at 13:04

## 2022-11-21 RX ADMIN — SODIUM CHLORIDE, POTASSIUM CHLORIDE, SODIUM LACTATE AND CALCIUM CHLORIDE: 600; 310; 30; 20 INJECTION, SOLUTION INTRAVENOUS at 12:00

## 2022-11-21 NOTE — PROGRESS NOTES
SMALL ARE PINKISH DRAINAGE NOTED TO DRESSING LEFT SHOULDER , AREA MARKED , IV DC'D SITE INTACT NO SWELLING NOTED , DISCHARGE INSTRUCTIONS GIVEN TO PT AND PT'S WIFE ARTHUR , BOTH VERBALIZED UNDERSTANDING , NO DISTRESS NOTED

## 2022-11-21 NOTE — OP NOTES
Operative Note    Patient: Ze Orellana  YOB: 1975  MRN: [id number]    Date of Procedure: 11/21/2022     Pre-Op Diagnosis: LEFT SHOULDER POSTERIOR LABRAL TEAR with paralabral cyst   Impingement syndrome     Post-Op Diagnosis: Same as preoperative diagnosis. Procedure(s):  Left Labral Decompression, Cyst decompression, Subacromial Decompression and Acromioplasty    Surgeon(s):  Bertin Feldman MD    Surgical Assistant: Surg Asst-1: Michelle Chew    Anesthesia: General     Estimated Blood Loss (mL):  Minimal    Complications: None    Specimens: * No specimens in log *     Implants: * No implants in log *    Drains: * No LDAs found *    Findings:  degenerative labral tear with paralabral cyst no obvious instability  Rotator cuff bursal sided fraying with no juliet tear  Impingement syndrome     Detailed Description of Procedure: The patient was in for the preoperative holding area surgical site was marked and brought back to the operating room. He underwent general anesthesia without any complications. He was then placed in the right lateral decubitus position with axillary and roll and making sure all the bony prominences are well-padded. Beanbag was used for supporting in this position. Left upper extremity was sterilely prepped and draped in orthopedic fashion. This was placed under 15 pounds of traction for facilitating the opening of the glenohumeral joint. Surgical timeout was conducted and surgery was then commenced by injecting 50 cc of normal saline in the glenohumeral joint. Arthroscope was introduced in the posterior lateral soft spot after making a stab incision with a knife. Spinal needle was used as guided and reported was created. Diagnostic arthroscopy commenced. Diagnostic arthroscopy revealed patient had degenerative labral tear with fraying and no obvious unstable flaps of labral tear noted.   This degenerative tear was extending from the 7:00 to the 4 o'clock position. Extra caution was used along the inferior aspect. There was concern for a paralabral cyst.  Hence a separate stab incision was made through which spinal needle was introduced and probed underneath the undersurface of the glenoid along the area of the cyst.  Minimal fluid was obtained and under inferior posterior aspect of the glenoid. This was then probed and multiple other spots with no obvious pockets of fluid noted. Once this was accomplished arthroscopic shaver was introduced to the anterior portal and debridement of the loose flaps of the labrum was performed. Repeat probing was then performed and there was no loose flaps of labrum that was deemed necessary to be repaired. Arthroscope was then withdrawn from the posterior portal and placing in the anterior portal and repeat evaluation of the posterior superior and posterior inferior labrum was performed. Similarly degenerative fraying was appreciated and a shaver was introduced to the posterior portal and cleaned up and no obvious loose flaps of labrum was noted. It was deemed unnecessary to perform any labral repair based on the lack of any loose flaps. It was also noticed that the patient did have grade 4 changes in the glenoid measuring 3 x 2 millimeters in size. Arthroscope was then withdrawn from the anterior portal and placed into the subacromial space through the posterior portal.  Pulmonary was used    Lateral portal was then created. Diagnostic arthroscopy and subacromial region reveals hyperemic separate subacromial bursa. This was then taken down using a combination of scopic shaver and electrocautery. Prominent prominent prominent bump along the anterior lateral edge of the acromion was noted. Bur was used in the neck and acromioplasty was then accomplished. Evaluation of the cuff revealed some fraying but no obvious juliet full-thickness or partial-thickness tear.   Based on this no further surgery was deemed necessary. Wound was thoroughly irrigated at this time portal sites were then cleaned and closed using combination of 2-0 Vicryl for subcutaneous tissue and 3-0 nylon for the skin. Sterile dressing was placed around the wound. Patient was then placed supine extubated transferred to the regular bed in a stable condition. Postoperative instructions:  -Nonweightbearing left upper extremity  -Start physical therapy for range of motion stretching and strengthening exercises as tolerated.   Okay to start doing elbow wrist and finger range of motion at this time.  -Pain control    Electronically Signed by Jam Rai MD on 11/21/2022 at 2:13 PM

## 2022-11-21 NOTE — ANESTHESIA PREPROCEDURE EVALUATION
Relevant Problems   No relevant active problems       Anesthetic History   No history of anesthetic complications            Review of Systems / Medical History  Patient summary reviewed and pertinent labs reviewed    Pulmonary  Within defined limits              Comments: Quit smoking 2 years ago   Neuro/Psych   Within defined limits           Cardiovascular  Within defined limits                Exercise tolerance: >4 METS     GI/Hepatic/Renal  Within defined limits              Endo/Other        Obesity     Other Findings          Past Medical History:   Diagnosis Date    Herniated cervical disc     Labral tear of shoulder     left       History reviewed. No pertinent surgical history.     Current Outpatient Medications   Medication Instructions    acetaminophen (TYLENOL) 500 mg, Oral, EVERY 6 HOURS AS NEEDED    clomiPHENE (CLOMID) 50 mg, Oral, DAILY    naproxen sodium (Aleve) 220 mg cap Oral, EVERY 4 HOURS AS NEEDED    OTHER ALEEVE        Current Facility-Administered Medications   Medication Dose Route Frequency    lactated Ringers infusion  20 mL/hr IntraVENous CONTINUOUS    ceFAZolin (ANCEF) 3 g in sterile water (preservative free) 30 mL IV syringe  3 g IntraVENous ONCE       Patient Vitals for the past 24 hrs:   Temp Pulse Resp BP SpO2   11/21/22 0912 36.7 °C (98 °F) 68 20 120/83 96 %       Lab Results   Component Value Date/Time    WBC 7.8 11/17/2022 08:12 AM    HGB 15.2 11/17/2022 08:12 AM    HCT 44.6 11/17/2022 08:12 AM    PLATELET 188 16/83/8637 08:12 AM    MCV 91.0 11/17/2022 08:12 AM     Lab Results   Component Value Date/Time    Sodium 140 11/17/2022 08:12 AM    Potassium 4.2 11/17/2022 08:12 AM    Chloride 110 (H) 11/17/2022 08:12 AM    CO2 26 11/17/2022 08:12 AM    Anion gap 4 (L) 11/17/2022 08:12 AM    Glucose 134 (H) 11/17/2022 08:12 AM    BUN 14 11/17/2022 08:12 AM    Creatinine 0.99 11/17/2022 08:12 AM    BUN/Creatinine ratio 14 11/17/2022 08:12 AM    Calcium 9.4 11/17/2022 08:12 AM     No results found for: APTT, PTP, INR, INREXT  Lab Results   Component Value Date/Time    Glucose 134 (H) 11/17/2022 08:12 AM         Physical Exam    Airway  Mallampati: I  TM Distance: 4 - 6 cm  Neck ROM: normal range of motion   Mouth opening: Normal     Cardiovascular    Rhythm: regular  Rate: normal         Dental  No notable dental hx       Pulmonary  Breath sounds clear to auscultation               Abdominal  GI exam deferred       Other Findings            Anesthetic Plan    ASA: 2  Anesthesia type: general    Monitoring Plan: Continuous noninvasive hemodynamic monitoring  Post-op pain plan if not by surgeon: peripheral nerve block single    Induction: Intravenous  Anesthetic plan and risks discussed with: Patient and Family      Left interscalene single-shot block

## 2022-11-21 NOTE — DISCHARGE INSTRUCTIONS
POLAR CARE 30 MINS ON 20 MINS OFF FOR 1ST 2 DAYS     THEN AS NEEDED     PT HAS FOLLOW UP APPOINTMENT

## 2022-11-21 NOTE — ANESTHESIA PROCEDURE NOTES
Peripheral Block    Start time: 11/21/2022 10:27 AM  End time: 11/21/2022 10:32 AM  Performed by: Naye Grier MD  Authorized by: Naye Grier MD       Pre-procedure: Indications: at surgeon's request and post-op pain management    Preanesthetic Checklist: patient identified, risks and benefits discussed, site marked, timeout performed, anesthesia consent given, patient being monitored and fire risk safety assessment completed and verbalized    Timeout Time: 10:27 EST      Block Type:   Block Type:   Interscalene  Laterality:  Left  Monitoring:  Standard ASA monitoring, continuous pulse ox, frequent vital sign checks, heart rate, responsive to questions and oxygen  Injection Technique:  Single shot  Procedures: ultrasound guided    Patient Position: seated  Prep: chlorhexidine    Location:  Interscalene  Needle Type:  SonoPlex  Needle Gauge:  22 G  Needle Localization:  Ultrasound guidance  Medication Injected:  Bupivacaine (PF) (MARCAINE) 0.5% injection - Peripheral Nerve Block, Neck   25 mL - 11/21/2022 10:31:00 AM  EPINEPHrine HCl (PF) (ADRENALIN) 1 mg/mL (1 mL) injection - Peripheral Nerve Block, Neck   0.1 mg - 11/21/2022 10:31:00 AM  dexamethasone (DECADRON) 4 mg/mL injection - Peripheral Nerve Block, Neck   4 mg - 11/21/2022 10:31:00 AM  Med Admin Time: 11/21/2022 10:31 AM    Assessment:  Number of attempts:  1  Injection Assessment:  Incremental injection every 5 mL, local visualized surrounding nerve on ultrasound, negative aspiration for blood, no intravascular symptoms, negative aspiration for CSF, no paresthesia and ultrasound image on chart  Patient tolerance:  Patient tolerated the procedure well with no immediate complications

## 2022-11-21 NOTE — PERIOP NOTES
Dr. Deonna Beckwith did a peripheral block in the patient's upper left extremity    The patient was identified correctly, the site was marked, the procedure was explained, surgical consent was signed, the patient was remained monitored throughout the anesthesia act, and all vital signs were recorded in the electronic system. Procedure was performed without immediate complications.   Time out: 36  Start time in: [address] [address] [facility]  End time: [address], [address] [place] [address]

## 2022-11-21 NOTE — ANESTHESIA POSTPROCEDURE EVALUATION
Procedure(s):  Left Labral Decompression, Cyst decompression, Subacromial Decompression and Acromioplasty. general    Anesthesia Post Evaluation      Multimodal analgesia: multimodal analgesia used between 6 hours prior to anesthesia start to PACU discharge  Patient location during evaluation: PACU  Patient participation: complete - patient participated  Level of consciousness: awake  Pain score: 0  Pain management: adequate  Airway patency: patent  Anesthetic complications: no  Cardiovascular status: acceptable  Respiratory status: acceptable  Hydration status: acceptable  Post anesthesia nausea and vomiting:  controlled  Final Post Anesthesia Temperature Assessment:  Normothermia (36.0-37.5 degrees C)      INITIAL Post-op Vital signs:   Vitals Value Taken Time   /105 11/21/22 1445   Temp 36.6 °C (97.9 °F) 11/21/22 1428   Pulse 76 11/21/22 1448   Resp 23 11/21/22 1448   SpO2 96 % 11/21/22 1448   Vitals shown include unvalidated device data.

## 2023-01-30 ENCOUNTER — OFFICE VISIT (OUTPATIENT)
Dept: ENDOCRINOLOGY | Age: 48
End: 2023-01-30
Payer: COMMERCIAL

## 2023-01-30 VITALS
HEIGHT: 76 IN | SYSTOLIC BLOOD PRESSURE: 131 MMHG | DIASTOLIC BLOOD PRESSURE: 85 MMHG | BODY MASS INDEX: 35.31 KG/M2 | RESPIRATION RATE: 18 BRPM | WEIGHT: 290 LBS | HEART RATE: 83 BPM | TEMPERATURE: 98.6 F | OXYGEN SATURATION: 96 %

## 2023-01-30 DIAGNOSIS — R74.01 TRANSAMINITIS: ICD-10-CM

## 2023-01-30 DIAGNOSIS — R35.1 NOCTURIA: ICD-10-CM

## 2023-01-30 DIAGNOSIS — E29.1 HYPOGONADISM IN MALE: Primary | ICD-10-CM

## 2023-01-30 PROCEDURE — 99214 OFFICE O/P EST MOD 30 MIN: CPT | Performed by: INTERNAL MEDICINE

## 2023-01-30 NOTE — PROGRESS NOTES
Tommy Duff MD          Patient Information Name : Shawna Dalton 50 y.o.   YOB: 1975         Referred by: Mary Daniels MD     Chief Complaint   Patient presents with    Hypogonadism         History of present illness    Shawna Dalton is a 50 y.o. male here for follow-up of hypogonadism. 1/30/23  Had disk replacement in the neck 1/2023 and shoulder labrum repair 11/21/2022  Hydrocodone and Oxycodone for short term for surgery   Not sleeping well  Not much activity - due to injuries  No worsening of sxs  No N/V  No flank pain  No alcohol use in 5 years  No FHX liver disease - father liver cirrhosis due to alcohol use    Prior history  [facility]. He presented with decrease in the libido, erectile dysfunction and testosterone was found to be 250 which was drawn at 11 AM.  Complains of snoring, few episodes of choking, did not have sleep study yet    Off Clomid: Testosterone 450, then 350  Continue to work on lifestyle  No chest pain  Liver enzymes elevated, no alcohol, asymptomatic    No biological children      Past Medical History:   Diagnosis Date    Herniated cervical disc     Labral tear of shoulder     left       Current Outpatient Medications   Medication Sig    psyllium (METAMUCIL) powd Take 1 Tablet by mouth daily. naproxen sodium 220 mg cap Take 1 Capsule by mouth every four (4) hours as needed. acetaminophen (TYLENOL) 500 mg tablet Take 500 mg by mouth every six (6) hours as needed for Pain. cyclobenzaprine (FLEXERIL) 10 mg tablet Take 0.5 Tablets by mouth three (3) times daily as needed for Muscle Spasm(s). (Patient not taking: Reported on 1/30/2023)    clomiPHENE (CLOMID) 50 mg tablet Take 1 Tablet by mouth daily. (Patient not taking: No sig reported)     No current facility-administered medications for this visit.        No Known Allergies      Review of Systems:  Per HPI    Physical Examination:  Blood pressure 131/85, pulse 83, temperature 98.6 °F (37 °C), temperature source Temporal, resp. rate 18, height 6' 4\" (1.93 m), weight 290 lb (131.5 kg), SpO2 96 %. General: pleasant, no distress, good eye contact  HEENT: no exophthalmos, no periorbital edema, EOMI  Neck: No thyromegaly  CVS: S1-S2 regular  RS: Normal respiratory effort  Abdomen: No hepatomegaly, nontender  Musculoskeletal: no tremors  Neurological: alert and oriented  Psychiatric: normal mood and affect  Skin: Normal color    Data Reviewed:     [x] Reviewed labs    Assessment/Plan:     No diagnosis found. Hypogonadism  Erectile dysfunction  Possible sleep apnea: We will get sleep study    Hypogonadotropic hypogonadism  Initial testosterone level was 250, after Clomid 1-1/2-month, testosterone level more than 400  Testosterone 320 July 2022  Off Clomid   testosterone level January 2023: 150, recent narcotics    Weight loss encouraged, clomiphene versus monitoring, he preferred monitoring    Prolactin, ferritin normal    Was Doing well on off label use of clomiphene citrate    Transaminitis: Asymptomatic, no alcohol, no change in the medications  Right upper quadrant ultrasound: Mild hepatomegaly  Transaminitis worsened  Refer to GI, individual for colonoscopy        ED: Cialis 20 mg did not help  He is on Viagra    Nonmorbid obesity: Goal is to lose 30 pounds so he can come off  testosterone, which also will help sleep apnea if he has. Aware of symptoms of sleep apnea      There are no Patient Instructions on file for this visit. Copy sent to: Thank you for allowing me to participate in the care of this patient. Armida Morgan MD          Patient María Elena Santiago verbalized understanding . Voice-recognition software was used to generate this report, which may result in some phonetic-based errors in the grammar and contents.   Even though attempts were made to correct all the mistakes, some may have been missed and remained in the body of the report.

## 2023-01-30 NOTE — PROGRESS NOTES
Lloyd Serrato is a 50 y.o. male here for   Chief Complaint   Patient presents with    Hypogonadism       1. Have you been to the ER, urgent care clinic since your last visit? Hospitalized since your last visit? -shoulder labrum repair [address] snd disc repair Nik Dudley    2. Have you seen or consulted any other health care providers outside of the [address] [place] [address] since your last visit?   Include any pap smears or colon screening.-Ortho

## 2023-01-30 NOTE — LETTER
1/30/2023    Patient: Everardo Rivera   YOB: 1975   Date of Visit: 1/30/2023     [place]  [address] [facility] [address]  Via Fax: [phone]    Dear Valentino Seltzer, MD,      Thank you for referring Mr. Everardo Rivera to [address] [place] [address] for evaluation. My notes for this consultation are attached. If you have questions, please do not hesitate to call me. I look forward to following your patient along with you.       Sincerely,    Myrna Mane MD

## 2023-04-22 DIAGNOSIS — E29.1 HYPOGONADISM IN MALE: Primary | ICD-10-CM

## 2023-04-23 DIAGNOSIS — E29.1 HYPOGONADISM IN MALE: Primary | ICD-10-CM

## 2023-05-17 RX ORDER — ACETAMINOPHEN 500 MG
500 TABLET ORAL EVERY 6 HOURS PRN
COMMUNITY

## 2023-05-17 RX ORDER — CYCLOBENZAPRINE HCL 10 MG
5 TABLET ORAL 3 TIMES DAILY PRN
COMMUNITY
Start: 2022-11-21

## 2023-05-17 RX ORDER — COVID-19 ANTIGEN TEST
1 KIT MISCELLANEOUS EVERY 4 HOURS PRN
COMMUNITY

## 2023-07-24 ENCOUNTER — OFFICE VISIT (OUTPATIENT)
Age: 48
End: 2023-07-24
Payer: COMMERCIAL

## 2023-07-24 VITALS
WEIGHT: 257 LBS | HEIGHT: 76 IN | DIASTOLIC BLOOD PRESSURE: 79 MMHG | OXYGEN SATURATION: 97 % | RESPIRATION RATE: 18 BRPM | HEART RATE: 67 BPM | SYSTOLIC BLOOD PRESSURE: 114 MMHG | BODY MASS INDEX: 31.29 KG/M2 | TEMPERATURE: 98.2 F

## 2023-07-24 DIAGNOSIS — E29.1 HYPOGONADISM IN MALE: Primary | ICD-10-CM

## 2023-07-24 DIAGNOSIS — E66.9 NON MORBID OBESITY: ICD-10-CM

## 2023-07-24 DIAGNOSIS — R74.01 ELEVATION OF LEVELS OF LIVER TRANSAMINASE LEVELS: ICD-10-CM

## 2023-07-24 DIAGNOSIS — N52.01 ERECTILE DYSFUNCTION DUE TO ARTERIAL INSUFFICIENCY: ICD-10-CM

## 2023-07-24 PROCEDURE — 3017F COLORECTAL CA SCREEN DOC REV: CPT | Performed by: INTERNAL MEDICINE

## 2023-07-24 PROCEDURE — G8427 DOCREV CUR MEDS BY ELIG CLIN: HCPCS | Performed by: INTERNAL MEDICINE

## 2023-07-24 PROCEDURE — 99214 OFFICE O/P EST MOD 30 MIN: CPT | Performed by: INTERNAL MEDICINE

## 2023-07-24 PROCEDURE — 4004F PT TOBACCO SCREEN RCVD TLK: CPT | Performed by: INTERNAL MEDICINE

## 2023-07-24 PROCEDURE — G8417 CALC BMI ABV UP PARAM F/U: HCPCS | Performed by: INTERNAL MEDICINE

## 2023-07-24 NOTE — PROGRESS NOTES
Dion Khoury is a 46 y.o. male here for   Chief Complaint   Patient presents with    Hypogonadism       1. Have you been to the ER or an urgent care clinic since your last visit?  - no    2. Have you been hospitalized since your last visit? - no    3. Have you seen or consulted any other health care providers outside of the [address] [place] [address] since your last visit?   Include any pap smears or colon screening.- no

## 2023-07-24 NOTE — PROGRESS NOTES
Cintia Figueroa MD               Patient Information Name : Ramin Kumar  50 y.o.   YOB: 1975           Referred by: Maurice Salazar MD                History of present illness      Ramin Kumar is a 50 y.o.  male here for follow-up of hypogonadism. He has changed the diet, lost 30 pounds, on 1500-calorie diet  Seen GI Dr. 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    He presented with decrease in the libido, erectile dysfunction and testosterone was found to be 250 which was drawn at 11 AM.  Complains of snoring, few episodes of choking, did not have sleep study yet      Off Clomid: Testosterone 450, then 350  Continue to work on lifestyle  No chest pain  Liver enzymes elevated, no alcohol, asymptomatic      No biological children           Review of Systems:    Per HPI      Physical Examination:  .    General: pleasant, no distress, good eye contact    HEENT: no exophthalmos, no periorbital edema, EOMI    Neck: No thyromegaly  CVS: S1-S2 regular    RS: Normal respiratory effort  Abdomen: No hepatomegaly, nontender    Musculoskeletal: no tremors    Neurological: alert and oriented    Psychiatric: normal mood and affect    Skin: Normal color      Data Reviewed:    No results found for: [id number]  Luteinizing Hormone   Date Value Ref Range Status   04/28/2021 2.8 mIU/mL Final     Comment:     (NOTE)  FEMALES:  0-12 YR: No reference range established  Pregnant: No reference range established  Non Pregnant:  Follicular Phase: 2.4-05.0  Mid-cycle peak: 22.8-76.1  Luteal Phase: 0.6-13.5  Post-menopausal on Menopausal Hormone Therapy (MHT):

## 2024-01-17 ENCOUNTER — NURSE ONLY (OUTPATIENT)
Age: 49
End: 2024-01-17

## 2024-01-17 DIAGNOSIS — R74.01 ELEVATION OF LEVELS OF LIVER TRANSAMINASE LEVELS: ICD-10-CM

## 2024-01-17 DIAGNOSIS — E66.9 NON MORBID OBESITY: ICD-10-CM

## 2024-01-17 DIAGNOSIS — E29.1 HYPOGONADISM IN MALE: ICD-10-CM

## 2024-01-18 LAB
ALBUMIN SERPL-MCNC: 4.1 G/DL (ref 3.5–5)
ALBUMIN/GLOB SERPL: 1.2 (ref 1.1–2.2)
ALP SERPL-CCNC: 70 U/L (ref 45–117)
ALT SERPL-CCNC: 26 U/L (ref 12–78)
AST SERPL-CCNC: 13 U/L (ref 15–37)
BILIRUB DIRECT SERPL-MCNC: <0.1 MG/DL (ref 0–0.2)
BILIRUB SERPL-MCNC: 0.4 MG/DL (ref 0.2–1)
GLOBULIN SER CALC-MCNC: 3.4 G/DL (ref 2–4)
PROT SERPL-MCNC: 7.5 G/DL (ref 6.4–8.2)

## 2024-01-19 LAB — TESTOST SERPL-MCNC: 416 NG/DL (ref 264–916)

## 2024-01-24 ENCOUNTER — OFFICE VISIT (OUTPATIENT)
Age: 49
End: 2024-01-24
Payer: COMMERCIAL

## 2024-01-24 VITALS
HEIGHT: 76 IN | WEIGHT: 260 LBS | BODY MASS INDEX: 31.66 KG/M2 | OXYGEN SATURATION: 96 % | TEMPERATURE: 98.4 F | RESPIRATION RATE: 18 BRPM | HEART RATE: 75 BPM | DIASTOLIC BLOOD PRESSURE: 79 MMHG | SYSTOLIC BLOOD PRESSURE: 130 MMHG

## 2024-01-24 DIAGNOSIS — E23.0 HYPOGONADOTROPIC HYPOGONADISM (HCC): Primary | ICD-10-CM

## 2024-01-24 DIAGNOSIS — N52.01 ERECTILE DYSFUNCTION DUE TO ARTERIAL INSUFFICIENCY: ICD-10-CM

## 2024-01-24 PROCEDURE — 99214 OFFICE O/P EST MOD 30 MIN: CPT | Performed by: INTERNAL MEDICINE

## 2024-01-24 PROCEDURE — G8427 DOCREV CUR MEDS BY ELIG CLIN: HCPCS | Performed by: INTERNAL MEDICINE

## 2024-01-24 PROCEDURE — 4004F PT TOBACCO SCREEN RCVD TLK: CPT | Performed by: INTERNAL MEDICINE

## 2024-01-24 PROCEDURE — G8484 FLU IMMUNIZE NO ADMIN: HCPCS | Performed by: INTERNAL MEDICINE

## 2024-01-24 PROCEDURE — G8417 CALC BMI ABV UP PARAM F/U: HCPCS | Performed by: INTERNAL MEDICINE

## 2024-01-24 PROCEDURE — 3017F COLORECTAL CA SCREEN DOC REV: CPT | Performed by: INTERNAL MEDICINE

## 2024-01-24 RX ORDER — TADALAFIL 10 MG/1
10 TABLET ORAL DAILY
COMMUNITY

## 2024-01-24 NOTE — PROGRESS NOTES
PATRIC [facility] DIABETES AND ENDOCRINOLOGY                 Rachna Marroquin MD               Patient Information Name : Hu Melvin  47 y.o.   YOB: 1975           Referred by: Connie Rizo MD         Chief Complaint   Patient presents with    Other     Hypogonadism              History of present illness      Hu Melvin is here for follow-up of hypogonadism.   He initially presented with decrease in the libido, erectile dysfunction and testosterone was found to be 250 which was drawn at 11 AM.       He has changed the diet, lost 30 pounds, he was on clomiphene, off of it for almost a year  Seen GI Dr. 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    General: pleasant, no distress, good eye contact    HEENT: no exophthalmos, no periorbital edema, EOMI    Neck: No thyromegaly  CVS: S1-S2 regular    RS: Normal respiratory effort  Abdomen: No hepatomegaly, nontender    Musculoskeletal: no tremors    Neurological: alert and oriented    Psychiatric: normal mood and affect    Skin: Normal color      Data Reviewed:    No results found for: \"[id number]\"  Luteinizing Hormone   Date Value Ref Range Status   04/28/2021 2.8 mIU/mL Final     Comment:     (NOTE)  FEMALES:  0-12 YR: No reference range established  Pregnant: No reference range established  Non Pregnant:  Follicular Phase: 1.9-12.8  Mid-cycle peak: 22.8-76.1  Luteal Phase: 0.6-13.5  Post-menopausal on Menopausal Hormone Therapy (MHT): 1.1-52.4  Post-menopausal Not on MHT: 8.6-61.8    MALES:  0-12 YR: No reference range established  >12 YR:  1.2-10.6       FSH, Serum   Date Value

## 2024-01-24 NOTE — PROGRESS NOTES
Hu Melvin is a 48 y.o. male here for   Chief Complaint   Patient presents with    Other     Hypogonadism       1. Have you been to the ER, urgent care clinic since your last visit?  Hospitalized since your last visit? - [facility]- Due to mood swings. End of 12/2023    2. Have you seen or consulted any other health care providers outside of the [facility] since your last visit?  Include any pap smears or colon screening.- Podiatrist

## 2024-07-25 PROBLEM — I77.9: Status: ACTIVE | Noted: 2024-07-25

## 2024-07-25 PROBLEM — E23.0 HYPOGONADOTROPIC HYPOGONADISM (HCC): Status: ACTIVE | Noted: 2024-07-25

## 2024-07-25 PROBLEM — N52.1: Status: ACTIVE | Noted: 2024-07-25

## 2024-11-07 ENCOUNTER — LAB (OUTPATIENT)
Age: 49
End: 2024-11-07

## 2024-11-07 DIAGNOSIS — N52.01 ERECTILE DYSFUNCTION DUE TO ARTERIAL INSUFFICIENCY: ICD-10-CM

## 2024-11-07 DIAGNOSIS — E23.0 HYPOGONADOTROPIC HYPOGONADISM (HCC): ICD-10-CM

## 2024-11-07 LAB
ALBUMIN SERPL-MCNC: 4.5 G/DL (ref 3.5–5)
ALBUMIN/GLOB SERPL: 1.2 (ref 1.1–2.2)
ALP SERPL-CCNC: 76 U/L (ref 45–117)
ALT SERPL-CCNC: 32 U/L (ref 12–78)
AST SERPL-CCNC: 14 U/L (ref 15–37)
BILIRUB DIRECT SERPL-MCNC: 0.2 MG/DL (ref 0–0.2)
BILIRUB SERPL-MCNC: 0.6 MG/DL (ref 0.2–1)
GLOBULIN SER CALC-MCNC: 3.7 G/DL (ref 2–4)
PROT SERPL-MCNC: 8.2 G/DL (ref 6.4–8.2)

## 2024-11-08 LAB — TESTOST SERPL-MCNC: 300 NG/DL (ref 264–916)

## 2024-11-14 ENCOUNTER — OFFICE VISIT (OUTPATIENT)
Age: 49
End: 2024-11-14
Payer: COMMERCIAL

## 2024-11-14 VITALS
DIASTOLIC BLOOD PRESSURE: 88 MMHG | SYSTOLIC BLOOD PRESSURE: 130 MMHG | HEIGHT: 76 IN | HEART RATE: 64 BPM | BODY MASS INDEX: 33.5 KG/M2 | WEIGHT: 275.1 LBS | TEMPERATURE: 97.4 F | OXYGEN SATURATION: 96 %

## 2024-11-14 DIAGNOSIS — E23.0 HYPOGONADOTROPIC HYPOGONADISM (HCC): ICD-10-CM

## 2024-11-14 DIAGNOSIS — N52.01 ERECTILE DYSFUNCTION DUE TO ARTERIAL INSUFFICIENCY: Primary | ICD-10-CM

## 2024-11-14 PROCEDURE — 4004F PT TOBACCO SCREEN RCVD TLK: CPT | Performed by: INTERNAL MEDICINE

## 2024-11-14 PROCEDURE — 99214 OFFICE O/P EST MOD 30 MIN: CPT | Performed by: INTERNAL MEDICINE

## 2024-11-14 PROCEDURE — 3017F COLORECTAL CA SCREEN DOC REV: CPT | Performed by: INTERNAL MEDICINE

## 2024-11-14 PROCEDURE — G8427 DOCREV CUR MEDS BY ELIG CLIN: HCPCS | Performed by: INTERNAL MEDICINE

## 2024-11-14 PROCEDURE — G8417 CALC BMI ABV UP PARAM F/U: HCPCS | Performed by: INTERNAL MEDICINE

## 2024-11-14 PROCEDURE — G8484 FLU IMMUNIZE NO ADMIN: HCPCS | Performed by: INTERNAL MEDICINE

## 2024-11-14 ASSESSMENT — PATIENT HEALTH QUESTIONNAIRE - PHQ9
SUM OF ALL RESPONSES TO PHQ QUESTIONS 1-9: 0
2. FEELING DOWN, DEPRESSED OR HOPELESS: NOT AT ALL
SUM OF ALL RESPONSES TO PHQ QUESTIONS 1-9: 0
1. LITTLE INTEREST OR PLEASURE IN DOING THINGS: NOT AT ALL
SUM OF ALL RESPONSES TO PHQ QUESTIONS 1-9: 0
SUM OF ALL RESPONSES TO PHQ9 QUESTIONS 1 & 2: 0
SUM OF ALL RESPONSES TO PHQ QUESTIONS 1-9: 0

## 2024-11-14 NOTE — PROGRESS NOTES
[facility] DIABETES AND ENDOCRINOLOGY                 Rachna Marroquin MD               Patient Information Name : Hu Melvin  47 y.o.   YOB: 1975           Referred by: Connie Rizo MD         Chief Complaint   Patient presents with    Hypogonadism          The patient (or guardian, if applicable) and other individuals in attendance with the patient were advised that Artificial Intelligence will be utilized during this visit to record, process the conversation to generate a clinical note, and support improvement of the AI technology. The patient (or guardian, if applicable) and other individuals in attendance at the appointment consented to the use of AI, including the recording.        History of present illness      Hu Melvin is here for follow-up of hypogonadism.   He initially presented with decrease in the libido, erectile dysfunction and testosterone was found to be 250 which was drawn at 11 AM.       He has changed the diet, lost 30 pounds, he was on clomiphene, off of it for almost a year  Seen GI Dr. 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 He acknowledges the need to resume exercise. Despite the weight gain, he does not experience excessive fatigue and notes improved well-being when active. He has started cycling again and is adhering to a healthier diet. No sleep disturbances or chest pain.      He had a disc replacement in the neck and is doing well. His shoulder and neck get sore, but he is fine with it.    SOCIAL HISTORY  - Denies alcohol intake        Physical Examination:  .    General: pleasant, no distress, good eye

## 2024-11-14 NOTE — PROGRESS NOTES
Hu Melvin is a 49 y.o. male here for   Chief Complaint   Patient presents with    Hypogonadism       1. Have you been to the ER, urgent care clinic since your last visit?  Hospitalized since your last visit? -  no  2. Have you seen or consulted any other health care providers outside of the [facility] since your last visit?  Include any pap smears or colon screening.-no

## (undated) DEVICE — 3M™ STERI-DRAPE™ U-DRAPE 1015: Brand: STERI-DRAPE™

## (undated) DEVICE — NEEDLE SPNL 18GA L3.5IN W/ QNCKE SHARPER BVL DURA CLICK

## (undated) DEVICE — 3M™ STERI-STRIP™ REINFORCED ADHESIVE SKIN CLOSURES, R1547, 1/2 IN X 4 IN (12 MM X 100 MM), 6 STRIPS/ENVELOPE: Brand: 3M™ STERI-STRIP™

## (undated) DEVICE — SUTURE VCRL + SZ 2-0 L27IN ABSRB UD CT-2 L26MM 1/2 CIR TAPR VCP269H

## (undated) DEVICE — SUT FIBRWIR 2 38IN BLU --

## (undated) DEVICE — CHS SHOULDER ARTHROSCOPY: Brand: MEDLINE INDUSTRIES, INC.

## (undated) DEVICE — SLING ARM L L17 1/2IN D8.5IN COT POLY DLX W/ SHLDR PD

## (undated) DEVICE — PROBE ABLAT 90DEG ASPIR MULTIPORT BPLR RF 1 PC ELECTRD ERGO

## (undated) DEVICE — SUTURE MCRYL + SZ 3-0 L27IN ABSRB UD L26MM SH 1/2 CIR MCP416H

## (undated) DEVICE — 1010 S-DRAPE TOWEL DRAPE 10/BX: Brand: STERI-DRAPE™

## (undated) DEVICE — SUTURE ETHLN SZ 3-0 L18IN NONABSORBABLE BLK L24MM FS-1 3/8 663G

## (undated) DEVICE — PAD,ABDOMINAL,8"X7.5",STERILE,LF,1/PK: Brand: MEDLINE

## (undated) DEVICE — NDL MULTIFIRE SCORPION ARTH --

## (undated) DEVICE — TUBE IRRIG L8IN LNG PT W/ CONN FOR PMP SYS REDEUCE

## (undated) DEVICE — GLOVE SURG SZ 85 L12IN FNGR THK79MIL GRN LTX FREE

## (undated) DEVICE — DRAPE,U/ SHT,SPLIT,PLAS,STERIL: Brand: MEDLINE

## (undated) DEVICE — BANDAGE COBAN 4 IN COMPR W4INXL5YD FOAM COHESIVE QUIK STK SELF ADH SFT

## (undated) DEVICE — GAUZE,SPONGE,4"X4",16PLY,STRL,LF,10/TRAY: Brand: MEDLINE

## (undated) DEVICE — SOLUTION IRRIG 3000ML 0.9% SOD CHL USP UROMATIC PLAS CONT

## (undated) DEVICE — POUCH FLD 36X29IN SHLDR HVY LO GLARE MATTE FINISH FLM 2 SUCT

## (undated) DEVICE — PAD ABSORBENT 40X28 IN POLY BACKING BLU DRI-SAFE

## (undated) DEVICE — ROCKER SWITCH PENCIL BLADE ELECTRODE, HOLSTER: Brand: EDGE

## (undated) DEVICE — TUBING, SUCTION, 1/4" X 12', STRAIGHT: Brand: MEDLINE

## (undated) DEVICE — SUTURE NONABSORBABLE MONOFILAMENT 4-0 FS-2 18 IN ETHILON 662H

## (undated) DEVICE — SHOULDER STABILIZATION KIT,                                    DISPOSABLE 12 PER BOX

## (undated) DEVICE — SOUTHSIDE TURNOVER: Brand: MEDLINE INDUSTRIES, INC.

## (undated) DEVICE — TUBING PMP L6FT CONT WAVE EXTN

## (undated) DEVICE — BLADE SHV L13CM DIA4MM EXCALIBUR AGG COOLCUT

## (undated) DEVICE — 3M™ TEGADERM™ TRANSPARENT FILM DRESSING FRAME STYLE, 1629, 8 IN X 12 IN (20 CM X 30 CM), 10/CT 8CT/CASE: Brand: 3M™ TEGADERM™

## (undated) DEVICE — T-MAX DISPOSABLE FACE MASK 8 PER BOX

## (undated) DEVICE — SUTURE FIBERLOOP SZ 2-0 L20IN NONABSORBABLE BLU STR NDL AR7234

## (undated) DEVICE — INTENDED FOR TISSUE SEPARATION, AND OTHER PROCEDURES THAT REQUIRE A SHARP SURGICAL BLADE TO PUNCTURE OR CUT.: Brand: BARD-PARKER ® CARBON RIB-BACK BLADES

## (undated) DEVICE — DRESSING,GAUZE,XEROFORM,CURAD,5"X9",ST: Brand: CURAD

## (undated) DEVICE — GOWN,PREVENTION PLUS,XLN/2XL,ST,22/CS: Brand: MEDLINE

## (undated) DEVICE — BUR SHV L13CM DIA4MM 8 FLUT OVL FOR RAP AGG BNE RESECT

## (undated) DEVICE — PREP SKN CHLRAPRP APL 26ML STR --

## (undated) DEVICE — REM POLYHESIVE ADULT PATIENT RETURN ELECTRODE: Brand: VALLEYLAB

## (undated) DEVICE — CANN ARTHSCP SHLDR SQRT7MMX7CM -- 5EA/BX

## (undated) DEVICE — GARMENT,MEDLINE,DVT,INT,CALF,MED, GEN2: Brand: MEDLINE

## (undated) DEVICE — LAMINECTOMY ARM CRADLE FOAM POSITIONER: Brand: CARDINAL HEALTH

## (undated) DEVICE — PROBE ABLAT 50DEG ASPIR MULTIPORT BPLR RF 1 PC ELECTRD ERGO

## (undated) DEVICE — 3M™ IOBAN™ 2 ANTIMICROBIAL INCISE DRAPE 6650EZ: Brand: IOBAN™ 2

## (undated) DEVICE — TUBING PMP L8FT LNG W/ CONN FOR AR-6400 REDEUCE

## (undated) DEVICE — DRAPE SURG TOWEL SM 18X12 IN INVISISHIELD MP W/ ADH PLAS NS

## (undated) DEVICE — SUTURE PDS + SZ 0 L27IN ABSRB VLT L36MM CT 1 1 2 CIR PDP340H

## (undated) DEVICE — SPONGE GZ W4XL4IN COT 12 PLY TYP VII WVN C FLD DSGN